# Patient Record
Sex: FEMALE | Race: WHITE | NOT HISPANIC OR LATINO | Employment: UNEMPLOYED | URBAN - NONMETROPOLITAN AREA
[De-identification: names, ages, dates, MRNs, and addresses within clinical notes are randomized per-mention and may not be internally consistent; named-entity substitution may affect disease eponyms.]

---

## 2022-10-16 ENCOUNTER — HOSPITAL ENCOUNTER (EMERGENCY)
Facility: HOSPITAL | Age: 57
Discharge: HOME | End: 2022-10-16
Attending: EMERGENCY MEDICINE | Admitting: EMERGENCY MEDICINE

## 2022-10-16 VITALS
DIASTOLIC BLOOD PRESSURE: 79 MMHG | WEIGHT: 180 LBS | TEMPERATURE: 98.2 F | BODY MASS INDEX: 31.89 KG/M2 | HEART RATE: 73 BPM | OXYGEN SATURATION: 96 % | SYSTOLIC BLOOD PRESSURE: 134 MMHG | HEIGHT: 63 IN | RESPIRATION RATE: 20 BRPM

## 2022-10-16 DIAGNOSIS — F41.9 ANXIETY: Primary | ICD-10-CM

## 2022-10-16 LAB
ALBUMIN SERPL-MCNC: 4 G/DL (ref 3.5–5.2)
ALBUMIN/GLOB SERPL: 1.1 G/DL
ALP SERPL-CCNC: 67 U/L (ref 39–117)
ALT SERPL W P-5'-P-CCNC: 10 U/L (ref 1–33)
AMPHET+METHAMPHET UR QL: NEGATIVE
AMPHETAMINES UR QL: NEGATIVE
ANION GAP SERPL CALCULATED.3IONS-SCNC: 10.6 MMOL/L (ref 5–15)
AST SERPL-CCNC: 12 U/L (ref 1–32)
BACTERIA UR QL AUTO: ABNORMAL /HPF
BARBITURATES UR QL SCN: NEGATIVE
BASOPHILS # BLD AUTO: 0.16 10*3/MM3 (ref 0–0.2)
BASOPHILS NFR BLD AUTO: 1.9 % (ref 0–1.5)
BENZODIAZ UR QL SCN: NEGATIVE
BILIRUB SERPL-MCNC: <0.2 MG/DL (ref 0–1.2)
BILIRUB UR QL STRIP: NEGATIVE
BUN SERPL-MCNC: 27 MG/DL (ref 6–20)
BUN/CREAT SERPL: 23.9 (ref 7–25)
BUPRENORPHINE SERPL-MCNC: NEGATIVE NG/ML
CALCIUM SPEC-SCNC: 8.4 MG/DL (ref 8.6–10.5)
CANNABINOIDS SERPL QL: NEGATIVE
CHLORIDE SERPL-SCNC: 106 MMOL/L (ref 98–107)
CLARITY UR: CLEAR
CO2 SERPL-SCNC: 21.4 MMOL/L (ref 22–29)
COCAINE UR QL: NEGATIVE
COLOR UR: YELLOW
CREAT SERPL-MCNC: 1.13 MG/DL (ref 0.57–1)
DEPRECATED RDW RBC AUTO: 43.6 FL (ref 37–54)
EGFRCR SERPLBLD CKD-EPI 2021: 56.2 ML/MIN/1.73
EOSINOPHIL # BLD AUTO: 0.64 10*3/MM3 (ref 0–0.4)
EOSINOPHIL NFR BLD AUTO: 7.5 % (ref 0.3–6.2)
ERYTHROCYTE [DISTWIDTH] IN BLOOD BY AUTOMATED COUNT: 13.4 % (ref 12.3–15.4)
ETHANOL BLD-MCNC: <10 MG/DL (ref 0–10)
ETHANOL UR QL: <0.01 %
GLOBULIN UR ELPH-MCNC: 3.6 GM/DL
GLUCOSE SERPL-MCNC: 189 MG/DL (ref 65–99)
GLUCOSE UR STRIP-MCNC: NEGATIVE MG/DL
HCT VFR BLD AUTO: 37 % (ref 34–46.6)
HGB BLD-MCNC: 12.4 G/DL (ref 12–15.9)
HGB UR QL STRIP.AUTO: NEGATIVE
HYALINE CASTS UR QL AUTO: ABNORMAL /LPF
IMM GRANULOCYTES # BLD AUTO: 0.03 10*3/MM3 (ref 0–0.05)
IMM GRANULOCYTES NFR BLD AUTO: 0.4 % (ref 0–0.5)
KETONES UR QL STRIP: NEGATIVE
LEUKOCYTE ESTERASE UR QL STRIP.AUTO: ABNORMAL
LYMPHOCYTES # BLD AUTO: 3.46 10*3/MM3 (ref 0.7–3.1)
LYMPHOCYTES NFR BLD AUTO: 40.5 % (ref 19.6–45.3)
MCH RBC QN AUTO: 29.3 PG (ref 26.6–33)
MCHC RBC AUTO-ENTMCNC: 33.5 G/DL (ref 31.5–35.7)
MCV RBC AUTO: 87.5 FL (ref 79–97)
METHADONE UR QL SCN: NEGATIVE
MONOCYTES # BLD AUTO: 0.54 10*3/MM3 (ref 0.1–0.9)
MONOCYTES NFR BLD AUTO: 6.3 % (ref 5–12)
NEUTROPHILS NFR BLD AUTO: 3.72 10*3/MM3 (ref 1.7–7)
NEUTROPHILS NFR BLD AUTO: 43.4 % (ref 42.7–76)
NITRITE UR QL STRIP: NEGATIVE
NRBC BLD AUTO-RTO: 0 /100 WBC (ref 0–0.2)
OPIATES UR QL: NEGATIVE
OXYCODONE UR QL SCN: NEGATIVE
PCP UR QL SCN: NEGATIVE
PH UR STRIP.AUTO: 6.5 [PH] (ref 5–8)
PLATELET # BLD AUTO: 338 10*3/MM3 (ref 140–450)
PMV BLD AUTO: 10 FL (ref 6–12)
POTASSIUM SERPL-SCNC: 4.4 MMOL/L (ref 3.5–5.2)
PROPOXYPH UR QL: NEGATIVE
PROT SERPL-MCNC: 7.6 G/DL (ref 6–8.5)
PROT UR QL STRIP: NEGATIVE
RBC # BLD AUTO: 4.23 10*6/MM3 (ref 3.77–5.28)
RBC # UR STRIP: ABNORMAL /HPF
REF LAB TEST METHOD: ABNORMAL
SODIUM SERPL-SCNC: 138 MMOL/L (ref 136–145)
SP GR UR STRIP: 1.01 (ref 1–1.03)
SQUAMOUS #/AREA URNS HPF: ABNORMAL /HPF
TRICYCLICS UR QL SCN: NEGATIVE
UROBILINOGEN UR QL STRIP: ABNORMAL
WBC # UR STRIP: ABNORMAL /HPF
WBC NRBC COR # BLD: 8.55 10*3/MM3 (ref 3.4–10.8)

## 2022-10-16 PROCEDURE — 96360 HYDRATION IV INFUSION INIT: CPT

## 2022-10-16 PROCEDURE — 82962 GLUCOSE BLOOD TEST: CPT

## 2022-10-16 PROCEDURE — 85025 COMPLETE CBC W/AUTO DIFF WBC: CPT | Performed by: PHYSICIAN ASSISTANT

## 2022-10-16 PROCEDURE — 80306 DRUG TEST PRSMV INSTRMNT: CPT | Performed by: PHYSICIAN ASSISTANT

## 2022-10-16 PROCEDURE — 82077 ASSAY SPEC XCP UR&BREATH IA: CPT | Performed by: PHYSICIAN ASSISTANT

## 2022-10-16 PROCEDURE — 99283 EMERGENCY DEPT VISIT LOW MDM: CPT

## 2022-10-16 PROCEDURE — 81001 URINALYSIS AUTO W/SCOPE: CPT | Performed by: PHYSICIAN ASSISTANT

## 2022-10-16 PROCEDURE — 80053 COMPREHEN METABOLIC PANEL: CPT | Performed by: PHYSICIAN ASSISTANT

## 2022-10-16 PROCEDURE — 63710000001 ONDANSETRON PER 8 MG: Performed by: PHYSICIAN ASSISTANT

## 2022-10-16 RX ORDER — HYDROXYZINE PAMOATE 50 MG/1
50 CAPSULE ORAL ONCE
Status: COMPLETED | OUTPATIENT
Start: 2022-10-16 | End: 2022-10-16

## 2022-10-16 RX ORDER — ONDANSETRON 4 MG/1
4 TABLET, FILM COATED ORAL ONCE
Status: COMPLETED | OUTPATIENT
Start: 2022-10-16 | End: 2022-10-16

## 2022-10-16 RX ORDER — VENLAFAXINE 75 MG/1
150 TABLET ORAL 2 TIMES DAILY
COMMUNITY
End: 2022-11-23 | Stop reason: ALTCHOICE

## 2022-10-16 RX ADMIN — SODIUM CHLORIDE 1000 ML: 9 INJECTION, SOLUTION INTRAVENOUS at 15:18

## 2022-10-16 RX ADMIN — HYDROXYZINE PAMOATE 50 MG: 50 CAPSULE ORAL at 15:12

## 2022-10-16 RX ADMIN — ONDANSETRON HYDROCHLORIDE 4 MG: 4 TABLET, FILM COATED ORAL at 17:07

## 2022-10-16 NOTE — ED NOTES
Pt reports that she lives at the [facility]. Pt has been w/o her Metformin and Effexor 2 days.  Pt believes her BG may be elevated.  Pt FSBS 146mg/dl on ED arrival.

## 2022-10-16 NOTE — ED PROVIDER NOTES
"Subjective  History of Present Illness:    Chief Complaint: Anxiety  History of Present Illness: 57-year-old female presents feeling anxious, and \"withdrawing from her prescription medications\" she states that she was recently released from retirement and now is at [facility] for alcohol and drug abuse.  She admits that she uses methamphetamines but has been clean for several months.  She states that she is withdrawing from her prescription meds which include Effexor, metformin, Remeron, and other medications that she cannot remember.  She has not taken those medications for 2 to 3 days.  Onset: Gradual onset  Duration: 2 days  Exacerbating / Alleviating factors: Missed medications  Associated symptoms: Anxiousness      Nurses Notes reviewed and agree, including vitals, allergies, social history and prior medical history.     REVIEW OF SYSTEMS: All systems reviewed and not pertinent unless noted.    Review of Systems   Psychiatric/Behavioral: Positive for agitation.        Anxious   All other systems reviewed and are negative.      Past Medical History:   Diagnosis Date   • Diabetes mellitus (HCC)        Allergies:    Cymbalta [duloxetine hcl]      Past Surgical History:   Procedure Laterality Date   • CARDIAC SURGERY     • HYSTERECTOMY           Social History     Socioeconomic History   • Marital status:    Tobacco Use   • Smoking status: Every Day     Packs/day: 1.00     Types: Cigarettes   Substance and Sexual Activity   • Drug use: Yes     Types: Methamphetamines     Comment: in Rehab[facility]   • Sexual activity: Not Currently         History reviewed. No pertinent family history.    Objective  Physical Exam:  /79   Pulse 79   Temp 98.2 °F (36.8 °C) (Oral)   Resp 18   Ht 160 cm (63\")   Wt 81.6 kg (180 lb)   SpO2 96%   BMI 31.89 kg/m²      Physical Exam  Vitals and nursing note reviewed.   Constitutional:       Appearance: She is well-developed.   HENT:      Head: Normocephalic and " atraumatic.   Cardiovascular:      Rate and Rhythm: Normal rate and regular rhythm.   Pulmonary:      Effort: Pulmonary effort is normal.      Breath sounds: Normal breath sounds.   Abdominal:      Palpations: Abdomen is soft.   Musculoskeletal:         General: Normal range of motion.      Cervical back: Normal range of motion and neck supple.   Skin:     General: Skin is warm and dry.   Neurological:      Mental Status: She is alert.      Deep Tendon Reflexes: Reflexes are normal and symmetric.   Psychiatric:      Comments: Anxiousness, restless and uneasy           Procedures    ED Course:         Lab Results (last 24 hours)     Procedure Component Value Units Date/Time    CBC Auto Differential [[id number]]  (Abnormal) Collected: 10/16/22 1511    Specimen: Blood Updated: 10/16/22 1525     WBC 8.55 10*3/mm3      RBC 4.23 10*6/mm3      Hemoglobin 12.4 g/dL      Hematocrit 37.0 %      MCV 87.5 fL      MCH 29.3 pg      MCHC 33.5 g/dL      RDW 13.4 %      RDW-SD 43.6 fl      MPV 10.0 fL      Platelets 338 10*3/mm3      Neutrophil % 43.4 %      Lymphocyte % 40.5 %      Monocyte % 6.3 %      Eosinophil % 7.5 %      Basophil % 1.9 %      Immature Grans % 0.4 %      Neutrophils, Absolute 3.72 10*3/mm3      Lymphocytes, Absolute 3.46 10*3/mm3      Monocytes, Absolute 0.54 10*3/mm3      Eosinophils, Absolute 0.64 10*3/mm3      Basophils, Absolute 0.16 10*3/mm3      Immature Grans, Absolute 0.03 10*3/mm3      nRBC 0.0 /100 WBC     Comprehensive Metabolic Panel [[id number]]  (Abnormal) Collected: 10/16/22 1511    Specimen: Blood Updated: 10/16/22 1540     Glucose 189 mg/dL      Comment: Glucose >180, Hemoglobin A1C recommended.        BUN 27 mg/dL      Creatinine 1.13 mg/dL      Sodium 138 mmol/L      Potassium 4.4 mmol/L      Chloride 106 mmol/L      CO2 21.4 mmol/L      Calcium 8.4 mg/dL      Total Protein 7.6 g/dL      Albumin 4.00 g/dL      ALT (SGPT) 10 U/L      AST (SGOT) 12 U/L      Alkaline Phosphatase 67 U/L      Total  Bilirubin <0.2 mg/dL      Globulin 3.6 gm/dL      A/G Ratio 1.1 g/dL      BUN/Creatinine Ratio 23.9     Anion Gap 10.6 mmol/L      eGFR 56.2 mL/min/1.73      Comment: National Kidney Foundation and American Society of Nephrology (ASN) Task Force recommended calculation based on the Chronic Kidney Disease Epidemiology Collaboration (CKD-EPI) equation refit without adjustment for race.       Narrative:      GFR Normal >60  Chronic Kidney Disease <60  Kidney Failure <15      Ethanol [[id number]] Collected: 10/16/22 1511    Specimen: Blood Updated: 10/16/22 1540     Ethanol <10 mg/dL      Ethanol % <0.010 %     Narrative:      This result is for medical use only and should not be used for forensic purposes.    Urinalysis With Culture If Indicated - Urine, Random Void [[id number]]  (Abnormal) Collected: 10/16/22 1614    Specimen: Urine, Random Void Updated: 10/16/22 1635     Color, UA Yellow     Appearance, UA Clear     pH, UA 6.5     Specific Gravity, UA 1.006     Glucose, UA Negative     Ketones, UA Negative     Bilirubin, UA Negative     Blood, UA Negative     Protein, UA Negative     Leuk Esterase, UA Small (1+)     Nitrite, UA Negative     Urobilinogen, UA 0.2 E.U./dL    Narrative:      In absence of clinical symptoms, the presence of pyuria, bacteria, and/or nitrites on the urinalysis result does not correlate with infection.    Urinalysis, Microscopic Only - Urine, Random Void [[id number]]  (Abnormal) Collected: 10/16/22 1614    Specimen: Urine, Random Void Updated: 10/16/22 1639     RBC, UA None Seen /HPF      WBC, UA 3-5 /HPF      Comment: Urine culture not indicated.        Bacteria, UA None Seen /HPF      Squamous Epithelial Cells, UA 0-2 /HPF      Hyaline Casts, UA None Seen /LPF      Methodology Manual Light Microscopy    Urine Drug Screen - Urine, Clean Catch [[id number]]  (Normal) Collected: 10/16/22 1615    Specimen: Urine, Clean Catch Updated: 10/16/22 1630     THC, Screen, Urine Negative     Phencyclidine  (PCP), Urine Negative     Cocaine Screen, Urine Negative     Methamphetamine, Ur Negative     Opiate Screen Negative     Amphetamine Screen, Urine Negative     Benzodiazepine Screen, Urine Negative     Tricyclic Antidepressants Screen Negative     Methadone Screen, Urine Negative     Barbiturates Screen, Urine Negative     Oxycodone Screen, Urine Negative     Propoxyphene Screen Negative     Buprenorphine, Screen, Urine Negative    Narrative:      Limitations of this procedure include the possibility of false positives due to interfering substances in the urine sample. Clinical data should be correlated with any questionable result. Positive results should be considered Presumptive Positive until results are confirmed with another methodology such as HPLC or GCMS.           No radiology results from the last 24 hrs       MDM  Number of Diagnoses or Management Options  Anxiety: new and requires workup     Amount and/or Complexity of Data Reviewed  Clinical lab tests: reviewed    Risk of Complications, Morbidity, and/or Mortality  Presenting problems: low  Diagnostic procedures: low    Patient Progress  Patient progress: stable        Final diagnoses:   Anxiety        Yeison Rocha Jr., PA-C  10/16/22 0944

## 2022-10-17 ENCOUNTER — TRANSCRIBE ORDERS (OUTPATIENT)
Dept: ADMINISTRATIVE | Facility: HOSPITAL | Age: 57
End: 2022-10-17

## 2022-10-17 DIAGNOSIS — Z12.39 ENCOUNTER FOR SPECIAL SCREENING EXAMINATION FOR NEOPLASM OF BREAST: Primary | ICD-10-CM

## 2022-10-17 DIAGNOSIS — Z12.31 ENCOUNTER FOR SCREENING MAMMOGRAM FOR MALIGNANT NEOPLASM OF BREAST: ICD-10-CM

## 2022-10-31 LAB — GLUCOSE BLDC GLUCOMTR-MCNC: 146 MG/DL (ref 70–130)

## 2022-11-22 ENCOUNTER — OFFICE VISIT (OUTPATIENT)
Dept: PSYCHIATRY | Facility: CLINIC | Age: 57
End: 2022-11-22

## 2022-11-22 VITALS
SYSTOLIC BLOOD PRESSURE: 156 MMHG | BODY MASS INDEX: 34.31 KG/M2 | HEIGHT: 64 IN | HEART RATE: 87 BPM | WEIGHT: 201 LBS | DIASTOLIC BLOOD PRESSURE: 84 MMHG

## 2022-11-22 DIAGNOSIS — F41.1 GENERALIZED ANXIETY DISORDER: ICD-10-CM

## 2022-11-22 DIAGNOSIS — F25.0 SCHIZOAFFECTIVE DISORDER, BIPOLAR TYPE: Primary | ICD-10-CM

## 2022-11-22 PROCEDURE — 90792 PSYCH DIAG EVAL W/MED SRVCS: CPT | Performed by: NURSE PRACTITIONER

## 2022-11-22 RX ORDER — VENLAFAXINE HYDROCHLORIDE 150 MG/1
150 CAPSULE, EXTENDED RELEASE ORAL 2 TIMES DAILY
COMMUNITY
Start: 2022-11-14

## 2022-11-22 RX ORDER — LISINOPRIL 20 MG/1
20 TABLET ORAL DAILY
COMMUNITY
Start: 2022-11-12

## 2022-11-22 RX ORDER — RISPERIDONE 0.5 MG/1
0.5 TABLET ORAL 2 TIMES DAILY
COMMUNITY
Start: 2022-10-17 | End: 2022-11-22 | Stop reason: ALTCHOICE

## 2022-11-22 RX ORDER — ATORVASTATIN CALCIUM 40 MG/1
40 TABLET, FILM COATED ORAL
COMMUNITY
Start: 2022-10-28 | End: 2023-02-13
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
## 2022-12-09 ENCOUNTER — APPOINTMENT (OUTPATIENT)
Dept: GENERAL RADIOLOGY | Facility: HOSPITAL | Age: 57
End: 2022-12-09

## 2022-12-09 ENCOUNTER — HOSPITAL ENCOUNTER (EMERGENCY)
Facility: HOSPITAL | Age: 57
Discharge: HOME | End: 2022-12-09
Attending: EMERGENCY MEDICINE | Admitting: EMERGENCY MEDICINE

## 2022-12-09 VITALS
BODY MASS INDEX: 30.73 KG/M2 | HEART RATE: 90 BPM | OXYGEN SATURATION: 94 % | DIASTOLIC BLOOD PRESSURE: 85 MMHG | HEIGHT: 64 IN | SYSTOLIC BLOOD PRESSURE: 117 MMHG | WEIGHT: 180 LBS | RESPIRATION RATE: 16 BRPM | TEMPERATURE: 98.1 F

## 2022-12-09 DIAGNOSIS — S73.102A SPRAIN OF LEFT HIP, INITIAL ENCOUNTER: Primary | ICD-10-CM

## 2022-12-09 DIAGNOSIS — S30.0XXA CONTUSION OF COCCYX, INITIAL ENCOUNTER: ICD-10-CM

## 2022-12-09 PROCEDURE — 99283 EMERGENCY DEPT VISIT LOW MDM: CPT

## 2022-12-09 PROCEDURE — 72220 X-RAY EXAM SACRUM TAILBONE: CPT

## 2022-12-09 PROCEDURE — 73502 X-RAY EXAM HIP UNI 2-3 VIEWS: CPT

## 2022-12-09 PROCEDURE — 96372 THER/PROPH/DIAG INJ SC/IM: CPT

## 2022-12-09 PROCEDURE — 25010000002 KETOROLAC TROMETHAMINE PER 15 MG: Performed by: PHYSICIAN ASSISTANT

## 2022-12-09 RX ORDER — CYCLOBENZAPRINE HCL 10 MG
10 TABLET ORAL 2 TIMES DAILY PRN
Qty: 20 TABLET | Refills: 0 | Status: SHIPPED | OUTPATIENT
Start: 2022-12-09 | End: 2022-12-21

## 2022-12-09 RX ORDER — KETOROLAC TROMETHAMINE 30 MG/ML
30 INJECTION, SOLUTION INTRAMUSCULAR; INTRAVENOUS ONCE
Status: COMPLETED | OUTPATIENT
Start: 2022-12-09 | End: 2022-12-09

## 2022-12-09 RX ORDER — MELOXICAM 7.5 MG/1
7.5 TABLET ORAL DAILY
Qty: 20 TABLET | Refills: 0 | Status: SHIPPED | OUTPATIENT
Start: 2022-12-09 | End: 2023-02-13

## 2022-12-09 RX ADMIN — KETOROLAC TROMETHAMINE 30 MG: 30 INJECTION, SOLUTION INTRAMUSCULAR; INTRAVENOUS at 19:14

## 2022-12-09 NOTE — ED PROVIDER NOTES
Subjective   History of Present Illness  Chief Complaint: Left hip and left elbow pain  History of Present Illness: This is a 57-year-old female presents after a ground-level fall where she slipped on water and landed on her buttocks, is complaint of pain to her tailbone and left hip, denies any other back pain, no saddle anesthesia, no bowel or bladder incontinence or retention.  Onset: Just prior to arrival  Duration: Continuous  Exacerbating / Alleviating factors: Worse with palpitation and walking  Associated symptoms: None    Nurses Notes reviewed and agree, including vitals, allergies, social history and prior medical history.    REVIEW OF SYSTEMS: All systems reviewed and not pertinent unless noted.    Positive for: Tailbone pain, left hip pain    Negative for: All other review systems reviewed and negative unspecified  Review of Systems    Past Medical History:   Diagnosis Date   • Diabetes mellitus (HCC)        Allergies   Allergen Reactions   • Cymbalta [Duloxetine Hcl] Unknown - Low Severity       Past Surgical History:   Procedure Laterality Date   • CARDIAC SURGERY     • HYSTERECTOMY         History reviewed. No pertinent family history.    Social History     Socioeconomic History   • Marital status:    Tobacco Use   • Smoking status: Every Day     Packs/day: 1.00     Types: Cigarettes   Vaping Use   • Vaping Use: Never used   Substance and Sexual Activity   • Drug use: Yes     Types: Methamphetamines     Comment: in [facility]hab[facility]   • Sexual activity: Defer           Objective   Physical Exam  CONSTITUTIONAL: Well developed, well nourished female,  in no acute distress.  VITAL SIGNS: Per nursing, reviewed and noted  SKIN: Exposed skin with no rashes, ulcerations or petechiae.  EYES: PERRLA. EOMI.  ENT: Normal voice.  Patient maintained wearing a mask throughout patient encounter due to coronavirus pandemic  RESPIRATORY:  No increased work of breathing. No retractions.  CARDIOVASCULAR:  Regular rate and  rhythm, no murmurs.  Good Peripheral pulses. Good cap refill to extremities.  GI: Abdomen soft, nontender, normal bowel sounds. No hernia. No ascites.  MUSCULOSKELETAL: Patient has some mild tenderness to the coccyx, also some tenderness overlying the left hip, there is no shortening of the left leg or malrotation of the left leg, no lumbar thoracic or cervical tenderness, adequate sensation distally, +2 patellar reflexes bilaterally.  NEUROLOGIC: Alert, oriented x 3. No gross deficits. GCS 15.  PSYCH: Appropriate affect.    Procedures           ED Course                                           MDM  Number of Diagnoses or Management Options  Diagnosis management comments: Vital signs have been stable, x-ray of the left hip and sacrum and coccyx show no abnormality likely just a sprain, contusion, will discharge with anti-inflammatories and muscle action follow-up with primary care.       Amount and/or Complexity of Data Reviewed  Tests in the radiology section of CPT®: reviewed  Independent visualization of images, tracings, or specimens: yes    Risk of Complications, Morbidity, and/or Mortality  Presenting problems: low  Diagnostic procedures: low  Management options: low    Patient Progress  Patient progress: improved      Final diagnoses:   Sprain of left hip, initial encounter   Contusion of coccyx, initial encounter       ED Disposition  ED Disposition     ED Disposition   Discharge    Condition   Stable    Comment   --             Provider, No Known  [facility] [id number] [phone]    Go to   As needed, If symptoms worsen    [facility] Emergency Department  [address] [address] [phone]  Go to   As needed, If symptoms worsen         Medication List      New Prescriptions    cyclobenzaprine 10 MG tablet  Commonly known as: FLEXERIL  Take 1 tablet by mouth 2 (Two) Times a Day As Needed for Muscle Spasms.     meloxicam 7.5 MG tablet  Commonly  known as: MOBIC  Take 1 tablet by mouth Daily.           Where to Get Your Medications      These medications were sent to [facility]/pharmacy #[id number] - [place], [place] - [address] - [phone]  - [phone] [address] [address]    Phone: [phone]   · cyclobenzaprine 10 MG tablet  · meloxicam 7.5 MG tablet          Carl Hollis PA  12/09/22 1950

## 2022-12-21 ENCOUNTER — OFFICE VISIT (OUTPATIENT)
Dept: PSYCHIATRY | Facility: CLINIC | Age: 57
End: 2022-12-21

## 2022-12-21 VITALS
SYSTOLIC BLOOD PRESSURE: 132 MMHG | HEIGHT: 64 IN | BODY MASS INDEX: 35 KG/M2 | DIASTOLIC BLOOD PRESSURE: 84 MMHG | WEIGHT: 205 LBS | HEART RATE: 76 BPM

## 2022-12-21 DIAGNOSIS — F25.0 SCHIZOAFFECTIVE DISORDER, BIPOLAR TYPE: Primary | ICD-10-CM

## 2022-12-21 DIAGNOSIS — F41.1 GENERALIZED ANXIETY DISORDER: ICD-10-CM

## 2022-12-21 PROCEDURE — 99214 OFFICE O/P EST MOD 30 MIN: CPT | Performed by: NURSE PRACTITIONER

## 2022-12-21 NOTE — PROGRESS NOTES
Subjective   Adriana Avlia is a 57 y.o. female who presents today for follow up    Chief Complaint:  Depression, anxiety    History of Present Illness:   History of Present Illness  Adriana Avila presents today for medication management follow up. Continues to struggle with anxiety and depression.  Initially diagnosed with bipolar disorder in 2008, thenschizophrenia 7 years ago.  Verbalizes constant racing thoughts, trouble staying on task and completing the tasks she has to do that are required.  Continues to live at [facility] and is trying to focus on completing classes as well as maintaining sobriety, says hat she recently phased up. 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  Has a couple friends that she has made while living there that recently left. Sleep is often broken, reports sleeping only about 2 hours at a time.  She does verbalize that visual hallucinations have improved since starting Vraylar.  Auditory hallucinations remain present and occur frequently.  Denies any adverse effects of medication.  Reports appetite is good. PHQ-9 total score: 22, SANTA-7 total score: 17.    Previous Psych Meds: Cymbalta, Prozac, Paxil, Abilify (hallucinations), Depakote, Rexulti (SI)     The following portions of the patient's history were reviewed and updated as appropriate: allergies, current medications, past family history, past medical history, past social history, past surgical history and problem list.      Past Medical History:  Past Medical History:   Diagnosis Date   • Diabetes mellitus (HCC)        Social History:  Social History     Socioeconomic History   • Marital status:    Tobacco Use   • Smoking status: Every Day     Packs/day: 1.00     Types: Cigarettes   Vaping Use   • Vaping Use: Never used   Substance and Sexual Activity   • Drug use: Yes     Types: Methamphetamines     Comment: in [facility]   • Sexual activity: Defer       Family History:  History reviewed. No pertinent family history.    Past  "Surgical History:  Past Surgical History:   Procedure Laterality Date   • CARDIAC SURGERY     • HYSTERECTOMY         Problem List:  There is no problem list on file for this patient.      Allergy:   Allergies   Allergen Reactions   • Cymbalta [Duloxetine Hcl] Unknown - Low Severity        Current Medications:   Current Outpatient Medications   Medication Sig Dispense Refill   • atorvastatin (LIPITOR) 40 MG tablet Take 40 mg by mouth every night at bedtime.     • lisinopril (PRINIVIL,ZESTRIL) 20 MG tablet Take 20 mg by mouth Daily.     • meloxicam (MOBIC) 7.5 MG tablet Take 1 tablet by mouth Daily. 20 tablet 0   • metFORMIN (GLUCOPHAGE) 500 MG tablet Take 1 tablet by mouth 2 (Two) Times a Day With Meals.     • venlafaxine XR (EFFEXOR-XR) 150 MG 24 hr capsule Take 150 mg by mouth 2 (Two) Times a Day.     • Cariprazine HCl (Vraylar) 3 MG capsule capsule Take 1 capsule by mouth Daily. 30 capsule 0   • Cariprazine HCl (Vraylar) 3 MG capsule capsule Take 1 capsule by mouth Daily. 30 capsule 1     No current facility-administered medications for this visit.       Review of Symptoms:    Review of Systems   Constitutional: Negative for activity change, appetite change, unexpected weight gain and unexpected weight loss.   Respiratory: Negative for shortness of breath.    Cardiovascular: Negative for chest pain.   Psychiatric/Behavioral: Positive for sleep disturbance, depressed mood and stress. Negative for suicidal ideas. The patient is nervous/anxious.      Physical Exam:   Physical Exam  Vitals reviewed.   Constitutional:       General: She is not in acute distress.     Appearance: Normal appearance.   Neurological:      Gait: Gait normal.     Vitals:   Blood pressure 132/84, pulse 76, height 162.6 cm (64\"), weight 93 kg (205 lb).    Mental Status Exam:   Hygiene:   good  Cooperation:  Cooperative  Eye Contact:  Fair  Psychomotor Behavior:  Restless  Affect:  Appropriate  Mood: sad, anxious and fluctates  Hopelessness: " Denies  Speech:  Rambling  Thought Process:  Disorganized and Tangential  Thought Content:  Mood congruent  Suicidal:  None  Homicidal:  None  Hallucinations:  Auditory and Visual  Delusion:  Paranoid  Memory:  Intact  Orientation:  Person, Place, Time and Situation  Reliability:  fair  Insight:  Poor  Judgement:  Fair  Impulse Control:  Fair    Lab Results:   Admission on 10/16/2022, Discharged on 10/16/2022   Component Date Value Ref Range Status   • Color, UA 10/16/2022 Yellow  Yellow, Straw Final   • Appearance, UA 10/16/2022 Clear  Clear Final   • pH, UA 10/16/2022 6.5  5.0 - 8.0 Final   • Specific Gravity, UA 10/16/2022 1.006  1.005 - 1.030 Final   • Glucose, UA 10/16/2022 Negative  Negative Final   • Ketones, UA 10/16/2022 Negative  Negative Final   • Bilirubin, UA 10/16/2022 Negative  Negative Final   • Blood, UA 10/16/2022 Negative  Negative Final   • Protein, UA 10/16/2022 Negative  Negative Final   • Leuk Esterase, UA 10/16/2022 Small (1+) (A)  Negative Final   • Nitrite, UA 10/16/2022 Negative  Negative Final   • Urobilinogen, UA 10/16/2022 0.2 E.U./dL  0.2 - 1.0 E.U./dL Final   • THC, Screen, Urine 10/16/2022 Negative  Negative Final   • Phencyclidine (PCP), Urine 10/16/2022 Negative  Negative Final   • Cocaine Screen, Urine 10/16/2022 Negative  Negative Final   • Methamphetamine, Ur 10/16/2022 Negative  Negative Final   • Opiate Screen 10/16/2022 Negative  Negative Final   • Amphetamine Screen, Urine 10/16/2022 Negative  Negative Final   • Benzodiazepine Screen, Urine 10/16/2022 Negative  Negative Final   • Tricyclic Antidepressants Screen 10/16/2022 Negative  Negative Final   • Methadone Screen, Urine 10/16/2022 Negative  Negative Final   • Barbiturates Screen, Urine 10/16/2022 Negative  Negative Final   • Oxycodone Screen, Urine 10/16/2022 Negative  Negative Final   • Propoxyphene Screen 10/16/2022 Negative  Negative Final   • Buprenorphine, Screen, Urine 10/16/2022 Negative  Negative Final   • WBC  10/16/2022 8.55  3.40 - 10.80 10*3/mm3 Final   • RBC 10/16/2022 4.23  3.77 - 5.28 10*6/mm3 Final   • Hemoglobin 10/16/2022 12.4  12.0 - 15.9 g/dL Final   • Hematocrit 10/16/2022 37.0  34.0 - 46.6 % Final   • MCV 10/16/2022 87.5  79.0 - 97.0 fL Final   • MCH 10/16/2022 29.3  26.6 - 33.0 pg Final   • MCHC 10/16/2022 33.5  31.5 - 35.7 g/dL Final   • RDW 10/16/2022 13.4  12.3 - 15.4 % Final   • RDW-SD 10/16/2022 43.6  37.0 - 54.0 fl Final   • MPV 10/16/2022 10.0  6.0 - 12.0 fL Final   • Platelets 10/16/2022 338  140 - 450 10*3/mm3 Final   • Neutrophil % 10/16/2022 43.4  42.7 - 76.0 % Final   • Lymphocyte % 10/16/2022 40.5  19.6 - 45.3 % Final   • Monocyte % 10/16/2022 6.3  5.0 - 12.0 % Final   • Eosinophil % 10/16/2022 7.5 (H)  0.3 - 6.2 % Final   • Basophil % 10/16/2022 1.9 (H)  0.0 - 1.5 % Final   • Immature Grans % 10/16/2022 0.4  0.0 - 0.5 % Final   • Neutrophils, Absolute 10/16/2022 3.72  1.70 - 7.00 10*3/mm3 Final   • Lymphocytes, Absolute 10/16/2022 3.46 (H)  0.70 - 3.10 10*3/mm3 Final   • Monocytes, Absolute 10/16/2022 0.54  0.10 - 0.90 10*3/mm3 Final   • Eosinophils, Absolute 10/16/2022 0.64 (H)  0.00 - 0.40 10*3/mm3 Final   • Basophils, Absolute 10/16/2022 0.16  0.00 - 0.20 10*3/mm3 Final   • Immature Grans, Absolute 10/16/2022 0.03  0.00 - 0.05 10*3/mm3 Final   • nRBC 10/16/2022 0.0  0.0 - 0.2 /100 WBC Final   • Glucose 10/16/2022 189 (H)  65 - 99 mg/dL Final    Glucose >180, Hemoglobin A1C recommended.   • BUN 10/16/2022 27 (H)  6 - 20 mg/dL Final   • Creatinine 10/16/2022 1.13 (H)  0.57 - 1.00 mg/dL Final   • Sodium 10/16/2022 138  136 - 145 mmol/L Final   • Potassium 10/16/2022 4.4  3.5 - 5.2 mmol/L Final   • Chloride 10/16/2022 106  98 - 107 mmol/L Final   • CO2 10/16/2022 21.4 (L)  22.0 - 29.0 mmol/L Final   • Calcium 10/16/2022 8.4 (L)  8.6 - 10.5 mg/dL Final   • Total Protein 10/16/2022 7.6  6.0 - 8.5 g/dL Final   • Albumin 10/16/2022 4.00  3.50 - 5.20 g/dL Final   • ALT (SGPT) 10/16/2022 10  1 - 33  U/L Final   • AST (SGOT) 10/16/2022 12  1 - 32 U/L Final   • Alkaline Phosphatase 10/16/2022 67  39 - 117 U/L Final   • Total Bilirubin 10/16/2022 <0.2  0.0 - 1.2 mg/dL Final   • Globulin 10/16/2022 3.6  gm/dL Final   • A/G Ratio 10/16/2022 1.1  g/dL Final   • BUN/Creatinine Ratio 10/16/2022 23.9  7.0 - 25.0 Final   • Anion Gap 10/16/2022 10.6  5.0 - 15.0 mmol/L Final   • eGFR 10/16/2022 56.2 (L)  >60.0 mL/min/1.73 Final    National Kidney Foundation and American Society of Nephrology (ASN) Task Force recommended calculation based on the Chronic Kidney Disease Epidemiology Collaboration (CKD-EPI) equation refit without adjustment for race.   • Ethanol 10/16/2022 <10  0 - 10 mg/dL Final   • Ethanol % 10/16/2022 <0.010  % Final   • RBC, UA 10/16/2022 None Seen  None Seen /HPF Final   • WBC, UA 10/16/2022 3-5 (A)  None Seen /HPF Final    Urine culture not indicated.   • Bacteria, UA 10/16/2022 None Seen  None Seen /HPF Final   • Squamous Epithelial Cells, UA 10/16/2022 0-2  None Seen, 0-2 /HPF Final   • Hyaline Casts, UA 10/16/2022 None Seen  None Seen /LPF Final   • Methodology 10/16/2022 Manual Light Microscopy   Final   • Glucose 10/16/2022 146 (H)  70 - 130 mg/dL Final    Serial Number: [id number]:  [id number]       EKG Results:  No orders to display       Assessment & Plan   Problems Addressed this Visit    None  Visit Diagnoses     Schizoaffective disorder, bipolar type (HCC)    -  Primary    Relevant Medications    Cariprazine HCl (Vraylar) 3 MG capsule capsule    Cariprazine HCl (Vraylar) 3 MG capsule capsule    Generalized anxiety disorder        Relevant Medications    Cariprazine HCl (Vraylar) 3 MG capsule capsule    Cariprazine HCl (Vraylar) 3 MG capsule capsule      Diagnoses       Codes Comments    Schizoaffective disorder, bipolar type (HCC)    -  Primary ICD-10-CM: F25.0  ICD-9-CM: 295.70     Generalized anxiety disorder     ICD-10-CM: F41.1  ICD-9-CM: 300.02           Visit Diagnoses:    ICD-10-CM  ICD-9-CM   1. Schizoaffective disorder, bipolar type (HCC)  F25.0 295.70   2. Generalized anxiety disorder  F41.1 300.02     -Reviewed previous available documentation and most recent available labs.   CHRISTIANO reviewed and is appropriate.     -The benefits of a healthy diet and exercise were discussed with patient, especially the positive effects they have on mental health. Patient encouraged to consider lifestyle modification regarding diet and exercise patterns to maximize results of mental health treatment.     Encouraged patient to practice good sleep hygiene.  Discussed going to bed at the same time and getting up at the same time every day. Consider a quiet activity, such as reading, part of your nighttime routine. Make your bedroom a dark, comfortable place where it is easy to fall asleep. Avoid or limit caffeine consumption. Limit screen use, especially two hours prior to bed (this includes watching TV, using smartphone, tablet or computer).     -Discussed importance of counseling to decrease anxiety like symptoms. Discussed coping mechanisms to decrease stress and anxiety: relaxation techniques, guided imagery, music therapy, staying active, support groups, diversional activities and avoid aggravating factors.  Discussed different coping mechanisms to better control depression.    Jennifer verbalizes that she continues to struggle with anxiety, depression, racing thoughts, insomnia and auditory/visual hallucinations.  Reports taking Vraylar as previously prescribed and has noticed minimal improvement in symptoms.  She is agreeable to increase medication to help better manage symptoms.  Adamantly denies any SI/HI.    -Increase Vraylar from 1.5 mg to 3 mg daily for mood stabilization (samples given)    GOALS:  Short Term Goals: Patient will be compliant with medication, and patient will have no significant medication related side effects.  Patient will be engaged in psychotherapy as indicated.  Patient will report  subjective improvement of symptoms.  Long term goals: To stabilize mood and treat/improve subjective symptoms, the patient will stay out of the hospital, the patient will be at an optimal level of functioning, and the patient will take all medications as prescribed.  The patient/guardian verbalized understanding and agreement with goals that were mutually set.    TREATMENT PLAN: Continue supportive psychotherapy efforts and medications as indicated for patient's diagnosis.  Pharmacological and Non-Pharmacological treatment options discussed during today's visit. Patient/Guardian acknowledged and verbally consented with current treatment plan and was educated on the importance of compliance with treatment and follow-up appointments.      MEDICATION ISSUES:  Discussed medication options and treatment plan of prescribed medication as well as the risks, benefits, any black box warnings, and side effects including potential falls, possible impaired driving, and metabolic adversities among others. Patient is agreeable to call the office with any worsening of symptoms or onset of side effects, or if any concerns or questions arise.  The contact information for the office is made available to the patient. Patient is agreeable to call 911 or go to the nearest ER should they begin having any SI/HI, or if any urgent concerns arise. No medication side effects or related complaints today.     MEDS ORDERED DURING VISIT:  New Medications Ordered This Visit   Medications   • Cariprazine HCl (Vraylar) 3 MG capsule capsule     Sig: Take 1 capsule by mouth Daily.     Dispense:  30 capsule     Refill:  0     Order Specific Question:   Lot Number?     Answer:   [id number]     Order Specific Question:   Expiration Date?     Answer:   1/1/2024     Order Specific Question:   Quantity     Answer:   21   • Cariprazine HCl (Vraylar) 3 MG capsule capsule     Sig: Take 1 capsule by mouth Daily.     Dispense:  30 capsule     Refill:  1       FOLLOW  UP:  Return in about 4 weeks (around 1/18/2023) for Recheck.             This document has been electronically signed by COLT Reed  December 21, 2022 13:08 EST    Please note that portions of this note were completed with a voice recognition program. Efforts were made to edit dictation, but occasionally words are mistranscribed.

## 2023-01-19 ENCOUNTER — HOSPITAL ENCOUNTER (OUTPATIENT)
Dept: MAMMOGRAPHY | Facility: HOSPITAL | Age: 58
Discharge: HOME | End: 2023-01-19
Admitting: NURSE PRACTITIONER
Payer: MEDICARE

## 2023-01-19 DIAGNOSIS — Z12.31 ENCOUNTER FOR SCREENING MAMMOGRAM FOR MALIGNANT NEOPLASM OF BREAST: ICD-10-CM

## 2023-01-19 PROCEDURE — 77067 SCR MAMMO BI INCL CAD: CPT

## 2023-01-19 PROCEDURE — 77063 BREAST TOMOSYNTHESIS BI: CPT

## 2023-01-24 ENCOUNTER — TRANSCRIBE ORDERS (OUTPATIENT)
Dept: ADMINISTRATIVE | Facility: HOSPITAL | Age: 58
End: 2023-01-24
Payer: MEDICARE

## 2023-01-24 DIAGNOSIS — R92.2 INCONCLUSIVE MAMMOGRAPHY: Primary | ICD-10-CM

## 2023-01-24 DIAGNOSIS — R92.2 INCONCLUSIVE MAMMOGRAM: ICD-10-CM
